# Patient Record
Sex: MALE | Race: BLACK OR AFRICAN AMERICAN | Employment: UNEMPLOYED | URBAN - METROPOLITAN AREA
[De-identification: names, ages, dates, MRNs, and addresses within clinical notes are randomized per-mention and may not be internally consistent; named-entity substitution may affect disease eponyms.]

---

## 2021-05-10 ENCOUNTER — OFFICE VISIT (OUTPATIENT)
Dept: URGENT CARE | Facility: URGENT CARE | Age: 41
End: 2021-05-10
Payer: MEDICAID

## 2021-05-10 VITALS
WEIGHT: 270 LBS | SYSTOLIC BLOOD PRESSURE: 138 MMHG | OXYGEN SATURATION: 96 % | DIASTOLIC BLOOD PRESSURE: 88 MMHG | TEMPERATURE: 97.6 F | HEART RATE: 78 BPM | RESPIRATION RATE: 16 BRPM

## 2021-05-10 DIAGNOSIS — M75.21 BICEPS TENDONITIS, RIGHT: ICD-10-CM

## 2021-05-10 DIAGNOSIS — B00.1 COLD SORE: ICD-10-CM

## 2021-05-10 DIAGNOSIS — H10.9 BACTERIAL CONJUNCTIVITIS OF BOTH EYES: Primary | ICD-10-CM

## 2021-05-10 DIAGNOSIS — J06.9 VIRAL URI WITH COUGH: ICD-10-CM

## 2021-05-10 DIAGNOSIS — B96.89 BACTERIAL CONJUNCTIVITIS OF BOTH EYES: Primary | ICD-10-CM

## 2021-05-10 PROCEDURE — 99204 OFFICE O/P NEW MOD 45 MIN: CPT | Performed by: FAMILY MEDICINE

## 2021-05-10 RX ORDER — POLYMYXIN B SULFATE AND TRIMETHOPRIM 1; 10000 MG/ML; [USP'U]/ML
1 SOLUTION OPHTHALMIC EVERY 4 HOURS
Qty: 10 ML | Refills: 0 | Status: SHIPPED | OUTPATIENT
Start: 2021-05-10 | End: 2021-05-16

## 2021-05-10 RX ORDER — VALACYCLOVIR HYDROCHLORIDE 1 G/1
2000 TABLET, FILM COATED ORAL 2 TIMES DAILY
Qty: 4 TABLET | Refills: 0 | Status: SHIPPED | OUTPATIENT
Start: 2021-05-10 | End: 2021-05-11

## 2021-05-10 RX ORDER — BENZONATATE 200 MG/1
200 CAPSULE ORAL 3 TIMES DAILY PRN
Qty: 20 CAPSULE | Refills: 0 | Status: SHIPPED | OUTPATIENT
Start: 2021-05-10

## 2021-05-10 RX ORDER — METHYLPREDNISOLONE 4 MG
4 TABLET, DOSE PACK ORAL SEE ADMIN INSTRUCTIONS
Qty: 21 TABLET | Refills: 0 | Status: SHIPPED | OUTPATIENT
Start: 2021-05-10

## 2021-05-10 SDOH — HEALTH STABILITY: MENTAL HEALTH: HOW OFTEN DO YOU HAVE A DRINK CONTAINING ALCOHOL?: NOT ASKED

## 2021-05-10 SDOH — HEALTH STABILITY: MENTAL HEALTH: HOW OFTEN DO YOU HAVE 6 OR MORE DRINKS ON ONE OCCASION?: NOT ASKED

## 2021-05-10 SDOH — HEALTH STABILITY: MENTAL HEALTH: HOW MANY STANDARD DRINKS CONTAINING ALCOHOL DO YOU HAVE ON A TYPICAL DAY?: NOT ASKED

## 2021-05-10 NOTE — PROGRESS NOTES
ASSESSMENT/ PLAN:    Bacterial conjunctivitis of both eyes     - trimethoprim-polymyxin b (POLYTRIM) 27986-1.1 UNIT/ML-% ophthalmic solution; Apply 1 drop to eye every 4 hours for 6 days    2 days    Cold sore     - valACYclovir (VALTREX) 1000 mg tablet; Take 2 tablets (2,000 mg) by mouth 2 times daily for 1 day    Lower lip    Biceps tendonitis, right     - methylPREDNISolone (MEDROL) 4 MG tablet therapy pack; Take 1 tablet (4 mg) by mouth See Admin Instructions follow package directions    We discussed the possible causes of the patient's musculoskeletal pain  ,  Including pain due to  ,  Muscle strain,   Stress/ strain to tendons,      Ibuprofen and/or acetaminophen as an alternative for pain     Recommend use of OTC topical muscle rubs that contain salicylate compounds ( James Valero, Aspercreme)-  We discussed that these compounds are absorbed locally in the skin and provide relief like an NSAID to the area of pain,  with little systemic exposure of the heart/ kidneys and other organs to the pain compounds with little long  term adverse systemic effects       Follow-up with primary care or ortho if persistent symptoms after 2 weeks  Ice pack to injured region until swelling resolved. Then warm packs prn for comfort    Viral URI with cough     - benzonatate (TESSALON) 200 MG capsule; Take 1 capsule (200 mg) by mouth 3 times daily as needed for cough    No wheezing, no sob           ----------------------------------------------------------------------------------------    SUBJECTIVE:  Chief Complaint   Patient presents with     Eye Problem     Possible pink eye in both eyes- hard to open eyes in the morning      Cough     x2 days. Worse at night      Musculoskeletal Problem     Right upper arm/shoulder injury 1 month ago      Val Ba is a 40 year old male who presents to the clinic today complaining of shoulder pain on the right side.    Onset of injury or pain was 1 month(s) ago and was gradual onset,  still present and constant. The pain is burning and sharp and superior.  Injury history: none    Exacerbated by: lifting objects   Relieved by: rest  Associated symptoms include local pain that develops with lifting heavy things with right arm.    The patient has tried activity restriction and OTC anti-inflammatories to improve symptoms.  He does not have a history of shoulder pain/injury.    Also has 2 days with bilateral eye mattering and injection,    Has had cough, some runny nose for 3 days-  No known spring allergies,, no itchy nose or eyes  Has developed clear cluster of blisters left lower lip x 1 day, with local pain    PMH:  No history of chronic health conditions    ALLERGIES:  Patient has no known allergies.    MEDs  No current outpatient medications on file prior to visit.  No current facility-administered medications on file prior to visit.       Social History     Tobacco Use     Smoking status: Never Smoker     Smokeless tobacco: Never Used   Substance Use Topics     Alcohol use: Yes        ROS:  CONSTITUTIONAL:NEGATIVE for fever, chills,    EYES: NEGATIVE for vision changes or irritation  RESP:NEGATIVE for significant cough or SOB  GI: NEGATIVE for nausea, abdominal pain,   or change in bowel habits      EXAM:  /88   Pulse 78   Temp 97.6  F (36.4  C) (Tympanic)   Resp 16   Wt 122.5 kg (270 lb)   SpO2 96%   General: no acute distress  Shoulder Exam: right shoulder  normal exam, no swelling,  instability; ligaments intact, FROM shoulder joint   no pain with resisted flexion of the shoulder    pain with resisted abduction of the shoulder    pain with resisted extension of the shoulder  no pain with resisted adduction of the shoulder  Passive ROM of the shoulder with no pain     tenderness over biceps tendon  No tenderness supraspinatous muscle  no tenderness over the AC joint  Ext: pulses intact.  Neuro: sensation intact to light touch.      EYES:   EOMI,   conjunctiva injected and  watering  HENT:   External ears with no swelling or lesions   Nose   without  Swelling, ulcers, erythema or lesions.  Lips- left lower lip with cluster of blisters 1 x 1  cm  NECK:   normal pain free ROM  RESP:   no labored respirations, no tachypnea  EXTREMITIES:   Full ROM without expression of pain or limitation x 4 extremities  NEURO:   Normal strength and tone, ambulation without difficulty,   normal speech and mentation  SKIN:  no suspicious lesions or rashes  PSYCH:   mentation and affect appears normal and patient appearance--appropriately groomed

## 2021-05-10 NOTE — PATIENT INSTRUCTIONS
Patient Education     Understanding Cold Sores  Cold sores (fever blisters) are small sores or blisters on the lip. Sometimes they are inside the mouth. Many people get them from time to time. Cold sores often are not serious. They often heal in a few days, sometimes longer. They are caused by 2 related viruses, herpes simplex type 1 (HSV-1) and herpes simplex type 2 (HSV-2). These viruses spread very easily. Many people have one or both of these viruses in their body. More than 4 in every 5 people are infected with HSV-1. This is also the most common cause of cold sores. Once you have the virus that causes cold sores, it stays in your body for the rest of your life. But it can be inactive for long periods.   What causes a cold sore?  Cold sores are often caused by HSV-1. In some cases they are caused by HSV-2. HSV-2 is the more common cause of genital sores. The herpes viruses can enter the body through a break in the skin such as a scrape. Or they may enter through mucous membranes such as the lips or mouth. Some ways to get the viruses include:     Kissing someone who has a cold sore    Sharing a drinking glass, eating utensils, or lip balm with someone who has a cold sore    Having sex with someone who has a cold sore  A  baby can also get the infection at birth.  If you have a herpes virus, you can pass it along even when you don t have a sore.   Cold sores flare up from time to time. Things that can cause an outbreak include:     Sun exposure    Fever    Stress or exhaustion    Menstruation    Skin irritation    Another unrelated illness such as pneumonia, urinary infection, or cancer  What are the symptoms of a cold sore?  Symptoms can include:    A blister-like sore or cluster of sores. These often occur at the edge of the lips but may appear inside the mouth.    Skin redness around the sores.    Pain or itching in the area of the outbreak. Often the pain or itching starts 12 to 24 hours before the  sore is visible.    Flu-like symptoms, including swollen glands, headache, body ache, or fever. These typically occur only at the time of the first infection.  Cold sores may also occur on fingers. They may rarely infect the eyes, a serious possible complication.   Some people have symptoms a day or 2 before an outbreak. They may feel soreness, burning, itching, or tingling before a cold sore appears. Cold sores often come back in the same area that they first appeared.   How are cold sores treated?  Treatment for cold sores focuses on easing and shortening symptoms. For people with frequent outbreaks, treatment works to decrease how often and how symptomatic future episodes will be. Treatments may include:     Prescription or over-the-counter pain medicines. These can help with mild pain, especially if sores are inside the mouth.    Antiviral medicines. These may be pills that are taken by mouth or a cream to apply to sores. They may help shorten an outbreak and reduce the severity of symptoms. They may be used to help prevent future outbreaks if you have infections that keep coming back.    Self-care such as extra rest and drinking more fluids. These may help ease the flu-like symptoms of a first outbreak.  How are cold sores diagnosed?  Your healthcare provider makes the diagnosis mainly by looking at the sores and doing a clinical exam. This may be confirmed by swab tests or blood tests.   How can I prevent cold sores?  You can help reduce the spread of the herpes viruses that cause cold sores. This can help prevent both you and others from getting cold sores. Follow these tips:     Don't kiss others if you have a cold sore. Also don't kiss someone with a cold sore.    Don't share eating utensils, lip balm, razors, or towels with someone who has a cold sore.    Wash your hands after touching the area of a cold sore. The herpes virus can be carried from your face to your hands when you touch the area of a cold  sore. When this happens, wash your hands thoroughly, for at least 20 seconds. When you can t wash with soap and water, use an alcohol-based hand .    Disinfect things you touch often, such as phones and keyboards.      If you feel a cold sore coming on, do the same things you would do when a cold sore is present to prevent spreading the virus.    Use condoms to help prevent passing on the viruses through sex.  What are the possible complications of a cold sore?  Cold sores often go away by themselves within a few days. In some cases it may take 1 to 2 weeks or longer. For most people, cold sores are not serious. But the viruses that cause cold sores can cause more serious illness. People who have a weak immune system may get more serious infections from herpes viruses. These include people being treated for cancer or people who have HIV. Babies may also become very ill from a herpes infection.    When should I call my healthcare provider?  Call your healthcare provider right away if you have any of these:    Fever of 100.4 F (38 C) or higher, or as directed by your provider    Pain that gets worse    You can't eat or drink because of painful sores    Symptoms don t get better in 5 to 7 days    Blisters spread beyond the mouth or lip to areas on the chest, arms, face (especially the eyes), or legs  [employer] last reviewed this educational content on 6/1/2019 2000-2021 [employer]. All rights reserved. This information is not intended as a substitute for professional medical care. Always follow your healthcare professional's instructions.           Patient Education     Bacterial Conjunctivitis    You have an infection in the membranes covering the white part of the eye. This part of the eye is called the conjunctiva. The infection is called conjunctivitis. The most common symptoms of conjunctivitis include a thick, pus-like discharge from the eye, swollen eyelids, redness, eyelids sticking  together upon awakening, and a gritty or scratchy feeling in the eye. Your infection was caused by bacteria. It may be treated with medicine. With treatment, the infection takes about 7 to 10 days to resolve.   Home care    Use prescribed antibiotic eye drops or ointment as directed to treat the infection.    Apply a warm compress (towel soaked in warm water) to the affected eye 3 to 4 times a day. Do this just before applying medicine to the eye.    Use a warm, wet cloth to wipe away crusting of the eyelids in the morning. This is caused by mucus drainage during the night. You may also use saline irrigating solution or artificial tears to rinse away mucus in the eye. Do not put a patch over the eye.    Wash your hands before and after touching the infected eye. This is to prevent spreading the infection to the other eye, and to other people. Don't share your towels or washcloths with others.    You may use acetaminophen or ibuprofen to control pain, unless another medicine was prescribed. Talk with your healthcare provider before using these medicines if you have chronic liver or kidney disease. Also talk with your provider if you have ever had a stomach ulcer or digestive bleeding.    Don't wear contact lenses until your eyes have healed and all symptoms are gone.    Follow-up care  Follow up with your healthcare provider, or as advised.  When to seek medical advice  Call your healthcare provider right away if any of these occur:    Worsening vision    Increasing pain in the eye    Increasing swelling or redness of the eyelid    Redness spreading around the eye  [employer] last reviewed this educational content on 4/1/2020 2000-2021 [employer]. All rights reserved. This information is not intended as a substitute for professional medical care. Always follow your healthcare professional's instructions.           Patient Education     Understanding Biceps Tendonitis    A tendon is a strong band of  tissue that connects muscle to bone. The biceps muscle is in the front of the upper arm. It helps with movements such as bending the elbow or raising the arm. The upper end of the biceps muscle is called the proximal end. It has two tendons called the long head and the short head. These tendons attach the muscle to the bones in the shoulder. Biceps tendonitis occurs when either of these tendons is irritated or red and swollen (inflamed). Most cases involve the long head.  Causes of biceps tendonitis  Causes can include:    Wear and tear of the tendon from aging or normal use over time    Overuse of the tendon from sports or work activities, especially those that involve repeated overhead movements    Injury to the tendon from a fall or other accident    Other problems in the shoulder, such as shoulder impingement or a rotator cuff tear  Symptoms of biceps tendonitis  Common symptoms include:    Pain in the front of the shoulder that may also travel down the arm. The pain may be worse with activity and at night.    Swelling in the shoulder    Clicking or catching sensation when using the arm and shoulder    Trouble moving the arm and shoulder    Weakness  Treating biceps tendonitis  Treatment for biceps tendonitis may include:    Resting the arm and shoulder. This involves limiting certain movements, such as reaching above the head or raising the arm. These can slow healing and make symptoms worse. You may also need to limit certain sports and types of work for a time.    Cold therapy. This involves using items such as ice packs to help relieve symptoms. Cold can help reduce pain and swelling.    Medicines. These help relieve pain and swelling. NSAIDs (nonsteroidal anti-inflammatory drugs) are the most common medicines used. Medicines may be prescribed or bought over-the-counter. They may be given as pills, or they may be applied to the skin in the form of a gel, cream, or patch.    Injections of medicine into the  injured area.  These help relieve pain and swelling.    Physical therapy and exercises.  These help improve strength and range of motion in the arm and shoulder.  Possible complications    If the tendon isn t given time to heal, symptoms may worsen. Also, the tendon may tear (rupture).    If the tendon ruptures or doesn t get better with treatment, your healthcare provider may recommend surgery. This most often involves repairing and reattaching the tendon.    When to call your healthcare provider  Call your healthcare provider right away if you have any of these:    Fever of 100.4 F (38 C) or higher, or as directed by your provider    Chills    Symptoms that don t get better with treatment, or get worse    Weakness or instability in the arm or shoulder    Sudden sharp pain, bruising, swelling, popping or snapping sensation, or bulge in the upper arm or shoulder    New symptoms  [employer] last reviewed this educational content on 12/1/2019 2000-2021 [employer]. All rights reserved. This information is not intended as a substitute for professional medical care. Always follow your healthcare professional's instructions.